# Patient Record
Sex: MALE
[De-identification: names, ages, dates, MRNs, and addresses within clinical notes are randomized per-mention and may not be internally consistent; named-entity substitution may affect disease eponyms.]

---

## 2021-10-14 ENCOUNTER — HOSPITAL ENCOUNTER (INPATIENT)
Dept: HOSPITAL 5 - ED | Age: 66
LOS: 2 days | Discharge: HOME | DRG: 291 | End: 2021-10-16
Attending: INTERNAL MEDICINE | Admitting: INTERNAL MEDICINE
Payer: MEDICARE

## 2021-10-14 DIAGNOSIS — I48.0: ICD-10-CM

## 2021-10-14 DIAGNOSIS — D62: ICD-10-CM

## 2021-10-14 DIAGNOSIS — J96.01: ICD-10-CM

## 2021-10-14 DIAGNOSIS — Z20.822: ICD-10-CM

## 2021-10-14 DIAGNOSIS — I13.0: Primary | ICD-10-CM

## 2021-10-14 DIAGNOSIS — E66.2: ICD-10-CM

## 2021-10-14 DIAGNOSIS — Z82.49: ICD-10-CM

## 2021-10-14 DIAGNOSIS — Z83.3: ICD-10-CM

## 2021-10-14 DIAGNOSIS — I50.33: ICD-10-CM

## 2021-10-14 DIAGNOSIS — N18.9: ICD-10-CM

## 2021-10-14 LAB
BUN SERPL-MCNC: 24 MG/DL (ref 9–20)
BUN/CREAT SERPL: 20 %
CALCIUM SERPL-MCNC: 8.7 MG/DL (ref 8.4–10.2)
HCT VFR BLD CALC: 23.8 % (ref 35.5–45.6)
HEMOLYSIS INDEX: 1
HGB BLD-MCNC: 7.5 GM/DL (ref 11.8–15.2)
MCHC RBC AUTO-ENTMCNC: 32 % (ref 32–34)
MCV RBC AUTO: 77 FL (ref 84–94)
PLATELET # BLD: 166 K/MM3 (ref 140–440)
RBC # BLD AUTO: 3.07 M/MM3 (ref 3.65–5.03)
T4 FREE SERPL-MCNC: 0.98 NG/DL (ref 0.76–1.46)

## 2021-10-14 PROCEDURE — 93005 ELECTROCARDIOGRAM TRACING: CPT

## 2021-10-14 PROCEDURE — 93306 TTE W/DOPPLER COMPLETE: CPT

## 2021-10-14 PROCEDURE — 86901 BLOOD TYPING SEROLOGIC RH(D): CPT

## 2021-10-14 PROCEDURE — 80162 ASSAY OF DIGOXIN TOTAL: CPT

## 2021-10-14 PROCEDURE — 80048 BASIC METABOLIC PNL TOTAL CA: CPT

## 2021-10-14 PROCEDURE — P9016 RBC LEUKOCYTES REDUCED: HCPCS

## 2021-10-14 PROCEDURE — 86850 RBC ANTIBODY SCREEN: CPT

## 2021-10-14 PROCEDURE — 84443 ASSAY THYROID STIM HORMONE: CPT

## 2021-10-14 PROCEDURE — 94760 N-INVAS EAR/PLS OXIMETRY 1: CPT

## 2021-10-14 PROCEDURE — 84484 ASSAY OF TROPONIN QUANT: CPT

## 2021-10-14 PROCEDURE — 85025 COMPLETE CBC W/AUTO DIFF WBC: CPT

## 2021-10-14 PROCEDURE — 86920 COMPATIBILITY TEST SPIN: CPT

## 2021-10-14 PROCEDURE — 36415 COLL VENOUS BLD VENIPUNCTURE: CPT

## 2021-10-14 PROCEDURE — 83735 ASSAY OF MAGNESIUM: CPT

## 2021-10-14 PROCEDURE — 86922 COMPATIBILITY TEST ANTIGLOB: CPT

## 2021-10-14 PROCEDURE — 84439 ASSAY OF FREE THYROXINE: CPT

## 2021-10-14 PROCEDURE — 86870 RBC ANTIBODY IDENTIFICATION: CPT

## 2021-10-14 PROCEDURE — 86900 BLOOD TYPING SEROLOGIC ABO: CPT

## 2021-10-14 PROCEDURE — G0378 HOSPITAL OBSERVATION PER HR: HCPCS

## 2021-10-14 PROCEDURE — 82962 GLUCOSE BLOOD TEST: CPT

## 2021-10-14 PROCEDURE — 83880 ASSAY OF NATRIURETIC PEPTIDE: CPT

## 2021-10-14 PROCEDURE — 85027 COMPLETE CBC AUTOMATED: CPT

## 2021-10-14 RX ADMIN — Medication SCH ML: at 23:01

## 2021-10-14 NOTE — HISTORY AND PHYSICAL REPORT
History of Present Illness


Chief complaint: 





I feel weak


History of present illness: 


64 YO Male with Obesity Hypoventilation Syndrome, DM, CHF, Anemia Chronic 

Disease presents to ED for evaluation.  Patient reports "I feel weak".  Patient 

states that he has experienced progressive and generalized weakness over the 

past 1 week with progressively worsening symptoms over the same timeframe.  

Patient knowledges decreased exercise tolerance, dyspnea on exertion, dyspnea at

rest, orthopnea, as well as paroxysmal nocturnal dyspnea.  EMS was notified and 

upon arrival the patient was found to be in distress and subsequently 

transported to [facility] for further care and evaluation of the aforementioned 

symptoms.  The patient was seen and evaluated in the emergency department.  All 

lab and imaging studies reviewed.  Patient found to have clinical symptoms 

consistent with diastolic CHF, as well as symptomatic anemia.  Patient placed in

observation status and admitted to telemetry.  Patient initiated on CHF 

protocol.  Patient transfused with packed red blood cells.  Patient denies 

fever, chills, chest pain, palpitation, productive cough, skin rash, recent ill 

contacts, known exposure to COVID-19, unilateral leg swelling, calf pain, 

individual/family history of DVT/PE/bleeding/blood clotting disorders.





Past History


Past Medical History: heart failure, hypertension, other (See HPI)


Past Surgical History: Other (Endoscopy)


Social history: , lives with family.  denies: smoking, alcohol abuse, 

prescription drug abuse


Family history: diabetes, hypertension





Medications and Allergies


                                    Allergies











Allergy/AdvReac Type Severity Reaction Status Date / Time


 


warfarin [From Coumadin] Allergy  Nausea Verified 10/14/21 13:21














Review of Systems


Constitutional: weakness





Exam





- Constitutional


Vitals: 


                                        











Temp Pulse Resp BP Pulse Ox


 


 98.2 F   84   15   140/53   100 


 


 10/14/21 13:16  10/14/21 14:45  10/14/21 14:45  10/14/21 14:45  10/14/21 14:45











General appearance: Present: mild distress





- EENT


Eyes: Present: PERRL


ENT: hearing intact, clear oral mucosa, other (Conjunctival pallor)





- Neck


Neck: Present: supple, normal ROM





- Respiratory


Respiratory effort: normal


Respiratory: bilateral: CTA





- Cardiovascular


Heart Sounds: Present: S1 & S2.  Absent: rub, click





- Extremities


Extremities: pulses symmetrical


Extremity abnormal: edema


Peripheral Pulses: within normal limits





- Abdominal


General gastrointestinal: Present: soft, non-tender, non-distended, normal bowel

sounds


Male genitourinary: Present: normal





- Integumentary


Integumentary: Present: clear, warm, dry





- Musculoskeletal


Musculoskeletal: generalized weakness





- Psychiatric


Psychiatric: appropriate mood/affect, intact judgment & insight





- Neurologic


Neurologic: CNII-XII intact, moves all extremities





HEART Score





- HEART Score


Troponin: 


                                        











Troponin T  < 0.010 ng/mL (0.00-0.029)   10/14/21  14:10    














Results





- Labs


CBC & Chem 7: 


                                 10/14/21 14:10





                                 10/14/21 14:10


Labs: 


                              Abnormal lab results











  10/14/21 10/14/21 Range/Units





  14:10 14:10 


 


RBC  3.07 L   (3.65-5.03)  M/mm3


 


Hgb  7.5 L   (11.8-15.2)  gm/dl


 


Hct  23.8 L   (35.5-45.6)  %


 


MCV  77 L   (84-94)  fl


 


MCH  24 L   (28-32)  pg


 


RDW  17.6 H   (13.2-15.2)  %


 


Chloride   107.9 H  ()  mmol/L


 


BUN   24 H  (9-20)  mg/dL


 


Glucose   112 H  ()  mg/dL














Assessment and Plan





- Patient Problems


(1) Congestive heart failure


Status: Acute   


Qualifiers: 


   Heart failure type: systolic   Heart failure chronicity: acute on chronic   

Qualified Code(s): I50.23 - Acute on chronic systolic (congestive) heart failure

  


Plan to address problem: 


Strict I's/O, monitor urine output every shift, daily weight, afterload reduc

tion, blood pressure control, supplemental oxygen, pulse oximetry, 

echocardiogram ordered and is pending at time of admission, thyroid panel, 

magnesium level, BNP








(2) Symptomatic anemia


Status: Acute   


Plan to address problem: 


Packed red blood cell transfusion, CBC, repeat CBC in a.m.








(3) Obesity hypoventilation syndrome


Status: Acute   


Plan to address problem: 


Balanced diet, increase physical activity discharge, outpatient pulmonary 

follow-up for sleep study








(4) DVT prophylaxis


Status: Acute   


Plan to address problem: 


SCD to bilateral lower extremities while in bed, patient is ambulatory

## 2021-10-14 NOTE — EMERGENCY DEPARTMENT REPORT
- General


Chief complaint: Recheck/Abnormal Lab/Rx


Stated complaint: ABNORMAL LABS


Time Seen by Provider: 10/14/21 13:30


Source: patient


Mode of arrival: Stretcher


Limitations: No Limitations





- History of Present Illness


Initial comments: 





Patient presents by ambulance secondary to generalized weakness and shortness of

breath.  He states that he is having ongoing generalized weakness and dyspnea.  

This is progressively worsened over the last week.  His wife decided to take him

to the hospital today to be seen because he was getting worse.  He was 

physically unable to walk out to the car due to weakness.  EMS was called.





Patient had outpatient labs drawn yesterday.  He was told today that his 

hemoglobin was 8 and that he needed to be seen.  He has a recent history of 

bleeding ulcers.  He was admitted twice at [place] for bleeding ulcers.  He 

required transfusion both times.


Severity scale (0 -10): 0





- Related Data


                                    Allergies











Allergy/AdvReac Type Severity Reaction Status Date / Time


 


warfarin [From Coumadin] Allergy  Nausea Verified 10/14/21 13:21














ED Review of Systems


ROS: 


Stated complaint: ABNORMAL LABS


Other details as noted in HPI





Comment: All other systems reviewed and negative


Constitutional: denies: fever


Eyes: denies: eye pain


ENT: denies: throat pain


Respiratory: denies: cough


Cardiovascular: denies: chest pain


Endocrine: denies: unexplained weight loss


Gastrointestinal: denies: abdominal pain


Genitourinary: denies: dysuria


Skin: denies: rash


Neurological: denies: headache


Hematological/Lymphatic: denies: easy bruising





ED Past Medical Hx





- Past Medical History


Previous Medical History?: Yes


Hx Congestive Heart Failure: Yes


Hx Diabetes: Yes





ED Physical Exam





- General


Limitations: No Limitations





ED Course


                                   Vital Signs











  10/14/21 10/14/21 10/14/21





  13:16 13:49 13:55


 


Temperature 98.2 F  


 


Pulse Rate 90 89 


 


Respiratory 16  





Rate   


 


Blood Pressure   


 


Blood Pressure 116/90  





[Left]   


 


O2 Sat by Pulse 99  97





Oximetry   














  10/14/21 10/14/21 10/14/21





  14:00 14:15 14:31


 


Temperature   


 


Pulse Rate 99 H 92 H 83


 


Respiratory 16 15 17





Rate   


 


Blood Pressure 131/56 131/56 140/53


 


Blood Pressure   





[Left]   


 


O2 Sat by Pulse 100 100 100





Oximetry   














  10/14/21





  14:45


 


Temperature 


 


Pulse Rate 84


 


Respiratory 15





Rate 


 


Blood Pressure 140/53


 


Blood Pressure 





[Left] 


 


O2 Sat by Pulse 100





Oximetry 














- Reevaluation(s)


Reevaluation #1: 





10/14/21 16:58


Labs have been noted. Case has been discussed with the patient and resource uti

lization. Will admit for transfusion.





ED Medical Decision Making





- Lab Data


Result diagrams: 


                                 10/14/21 14:10





                                 10/14/21 14:10





- Medical Decision Making





Patient presents with symptomatic anemia in the form of shortness of breath and 

generalized weakness. He has been transfused before. We will proceed with 

ongoing transfusion at this time. Patient does not have evidence of STEMI. He 

clinically does not have chest pain. I do not believe this represents NSTEMI. 

Whether or not he will benefit from a single unit is unknown. The degree of his 

congestive heart failure is also unknown. I would hate to create pulmonary edema

 by giving him packed red cells. He will be admitted for observation status.


Critical Care Time: No


Critical care attestation.: 


If time is entered above; I have spent that time in minutes in the direct care 

of this critically ill patient, excluding procedure time.








ED Disposition


Clinical Impression: 


 Symptomatic anemia





Disposition: 09 ADMITTED AS INPATIENT


Is pt being admited?: Yes


Condition: Stable

## 2021-10-15 LAB
BASOPHILS # (AUTO): 0.1 K/MM3 (ref 0–0.1)
BASOPHILS NFR BLD AUTO: 1.8 % (ref 0–1.8)
BUN SERPL-MCNC: 20 MG/DL (ref 9–20)
BUN/CREAT SERPL: 18 %
CALCIUM SERPL-MCNC: 8.6 MG/DL (ref 8.4–10.2)
EOSINOPHIL # BLD AUTO: 0.1 K/MM3 (ref 0–0.4)
EOSINOPHIL NFR BLD AUTO: 1.7 % (ref 0–4.3)
HCT VFR BLD CALC: 26.4 % (ref 35.5–45.6)
HEMOLYSIS INDEX: 4
HGB BLD-MCNC: 8.3 GM/DL (ref 11.8–15.2)
LYMPHOCYTES # BLD AUTO: 0.4 K/MM3 (ref 1.2–5.4)
LYMPHOCYTES NFR BLD AUTO: 7.2 % (ref 13.4–35)
MCHC RBC AUTO-ENTMCNC: 32 % (ref 32–34)
MCV RBC AUTO: 80 FL (ref 84–94)
MONOCYTES # (AUTO): 0.5 K/MM3 (ref 0–0.8)
MONOCYTES % (AUTO): 9.1 % (ref 0–7.3)
PLATELET # BLD: 153 K/MM3 (ref 140–440)
RBC # BLD AUTO: 3.32 M/MM3 (ref 3.65–5.03)

## 2021-10-15 PROCEDURE — 30233N1 TRANSFUSION OF NONAUTOLOGOUS RED BLOOD CELLS INTO PERIPHERAL VEIN, PERCUTANEOUS APPROACH: ICD-10-PCS | Performed by: INTERNAL MEDICINE

## 2021-10-15 RX ADMIN — METOPROLOL TARTRATE SCH: 100 TABLET, FILM COATED ORAL at 22:04

## 2021-10-15 RX ADMIN — Medication SCH ML: at 21:55

## 2021-10-15 RX ADMIN — Medication SCH ML: at 22:03

## 2021-10-15 RX ADMIN — METOPROLOL TARTRATE SCH MG: 100 TABLET, FILM COATED ORAL at 17:36

## 2021-10-15 RX ADMIN — ONDANSETRON PRN MG: 2 INJECTION INTRAMUSCULAR; INTRAVENOUS at 04:15

## 2021-10-15 RX ADMIN — ONDANSETRON PRN MG: 2 INJECTION INTRAMUSCULAR; INTRAVENOUS at 21:53

## 2021-10-15 NOTE — CONSULTATION
History of Present Illness


Consult date: 10/15/21


Requesting physician: SELMA MCCLENDON


Consult reason: congestive heart failure


History of present illness: 





Patient is a 65-year-old male with a past medical history A. fib, of chronic 

anemia, CKD HFpEF, GI bleeds, diabetes presented to the ED with a complaint of 

feeling weak x1. Patient reports that over the last week he has progressively 

felt weaker he notices decreased exercise tolerance dyspnea at exertion dyspnea 

at rest orthopnea and paroxysmal nocturnal dyspnea. Patient had labs drawn 

recently and received call from primary care clinic yesterday telling him that 

hemoglobin was low and they needed to go to the ED. Of note at the time of 

interview patient recently had blood transfusions patient reports that after 

receiving blood he feels much better. Patient is previously known to our pract

ice. He follows with follows with Dr. Dneis Brown of [place]. Cardiology is 

consulted for heart failure








Past History


Past Medical History: heart failure, hypertension, other (See HPI)


Past Surgical History: Other (Endoscopy)


Social history: , lives with family.  denies: smoking, alcohol abuse, 

prescription drug abuse


Family history: diabetes, hypertension





Medications and Allergies


                                    Allergies











Allergy/AdvReac Type Severity Reaction Status Date / Time


 


warfarin [From Coumadin] Allergy  Nausea Verified 10/14/21 13:21











                                Home Medications











 Medication  Instructions  Recorded  Confirmed  Last Taken  Type


 


ALPRAZolam 0.25 mg PO PRN 10/14/21 10/14/21 Unknown History


 


Metoprolol 100 mg PO DAILY 10/14/21 10/14/21 Unknown History


 


NovoLIN 70/30 25 units SC DAILY 10/14/21 10/14/21 10/14/21 08:00 History


 


Sertraline 50 mg PO DAILY 10/14/21 10/14/21 Unknown History


 


Xarelto 20 mg PO DAILY 10/14/21 10/14/21 Unknown History


 


dilTIAZem 180 mg PO BID 10/14/21 10/14/21 Unknown History


 


metFORMIN 1,000 mg PO BID 10/14/21 10/14/21 Unknown History











Active Meds: 


Active Medications





Acetaminophen (Acetaminophen 325 Mg Tab)  650 mg PO Q4H PRN


   PRN Reason: Pain MILD(1-3)/Fever >100.5/HA


   Last Admin: 10/15/21 03:58 Dose:  650 mg


   Documented by: 


Albuterol (Albuterol 2.5 Mg/3 Ml Nebu)  2.5 mg IH Q4HRT PRN


   PRN Reason: Shortness Of Breath


Bumetanide (Bumetanide 1 Mg Tab)  2 mg PO DAILY [facility]


Digoxin (Digoxin 0.125 Mg Tab)  0.125 mg PO DAILY@1700 [facility]


Diltiazem HCl (Diltiazem 60 Mg Tab)  180 mg PO BID [facility]


Hydromorphone HCl (Hydromorphone 1 Mg/1 Ml Inj)  0.5 mg IV Q23H PRN


   PRN Reason: Pain , Severe (7-10)


Sodium Chloride (Nacl 0.9% 500 Ml)  500 mls @ 0 mls/hr IV ONCE ANN


Metoprolol Tartrate (Metoprolol Tartrate 100 Mg Tab)  100 mg PO BID [facility]


Ondansetron HCl (Ondansetron 4 Mg/2 Ml Inj)  4 mg IV Q8H PRN


   PRN Reason: Nausea And Vomiting


   Last Admin: 10/15/21 04:15 Dose:  4 mg


   Documented by: 


Oxycodone/Acetaminophen (Oxycodone /Acetaminophen 5-325mg Tab)  1 tab PO Q16H 

PRN


   PRN Reason: Pain, Moderate (4-6)


Sodium Chloride (Sodium Chloride 0.9% 10 Ml Flush Syringe)  10 ml IV BID ANN


Sodium Chloride (Sodium Chloride 0.9% 10 Ml Flush Syringe)  10 ml IV PRN PRN


   PRN Reason: LINE FLUSH











Review of Systems


Constitutional: no weight loss, no weight gain, no fever


Cardiovascular: orthopnea, shortness of breath, dyspnea on exertion, paroxysmal 

nocturnal dyspnea, no chest pain


Respiratory: shortness of breath, dyspnea on exertion, no cough, no cough with 

sputum, no excessive sputum


Gastrointestinal: no abdominal pain, no nausea, no vomiting


Musculoskeletal: no neck pain, no shooting arm pain, no arm numbness/tingling


Integumentary: no rash, no pruritis, no redness


Neurological: no head injury, no transient paralysis, no paralysis


Psychiatric: no anxiety, no memory loss


Endocrine: no cold intolerance, no heat intolerance





Physical Examination


                                   Vital Signs











Temp Pulse Resp BP Pulse Ox


 


 98.2 F   90   16   116/90   99 


 


 10/14/21 13:16  10/14/21 13:16  10/14/21 13:16  10/14/21 13:16  10/14/21 13:16











General appearance: no acute distress


HEENT: Positive: PERRL


Cardiac: Positive: irregularly irregular


Lungs: Positive: Normal Breath Sounds


Neuro: Positive: Grossly Intact


Abdomen: Positive: Soft, Active Bowel Sounds


Skin: Negative: Rash, Suspicious Lesions, Ulceration


Extremities: Present: upper extr. pulses, lower extr. pulses, edema





Results





                                 10/15/21 11:48





                                 10/15/21 07:21


                                       CBC











  10/15/21 Range/Units





  11:48 


 


WBC  5.6  (4.5-11.0)  K/mm3


 


RBC  3.32 L  (3.65-5.03)  M/mm3


 


Hgb  8.3 L  (11.8-15.2)  gm/dl


 


Hct  26.4 L  (35.5-45.6)  %


 


Plt Count  153  (140-440)  K/mm3


 


Lymph # (Auto)  0.4 L  (1.2-5.4)  K/mm3


 


Mono # (Auto)  0.5  (0.0-0.8)  K/mm3


 


Eos # (Auto)  0.1  (0.0-0.4)  K/mm3


 


Baso # (Auto)  0.1  (0.0-0.1)  K/mm3








                          Comprehensive Metabolic Panel











  10/15/21 Range/Units





  07:21 


 


Sodium  143  (137-145)  mmol/L


 


Potassium  4.7  (3.6-5.0)  mmol/L


 


Chloride  105.9  ()  mmol/L


 


Carbon Dioxide  26  (22-30)  mmol/L


 


BUN  20  (9-20)  mg/dL


 


Creatinine  1.1  (0.8-1.3)  mg/dL


 


Glucose  107 H  ()  mg/dL


 


Calcium  8.6  (8.4-10.2)  mg/dL














- Imaging and Cardiology


Echo: report reviewed


EKG: report reviewed





EKG interpretations





- Telemetry


EKG Rhythm: Atrial Fibrillation





- EKG


Supraventricular dysrhythmia: atrial fibrillation





Assessment and Plan





EKG shows A. fib rate controlled no acute ischemic changes


Outpatient regimen Xarelto, Bumex 2 mg p.o. twice daily diltiazem 180 mg twice 

daily, metoprolol 100 mg twice daily digoxin 250 mcg


Echo 10/14/2021-EF 50 to 55%, right ventricle systolic function is normal, right

ventricle mildly dilated, left atrium mildly dilated, and no aortic 

regurgitation, mild tricuspid regurgitation, mild mitral regurgitation


We will hold anticoagulation due to patient's anemia and history of GI bleeds


We will resume patient's outpatient medication regimen. Bumex 2mg p.o. daily


Recommend patient follows up with his primary cardiologist and consider a 

watchman device due to his chronic anemia and history of GI bleeds








Patient seen in conjunction with Dr. Newby who agrees with plan of care. Will 

continue to follow








- Patient Problems


(1) CKD (chronic kidney disease)


Current Visit: Yes   Status: Acute   





(2) Diabetes


Current Visit: Yes   Status: Acute   





(3) Afib


Current Visit: Yes   Status: Acute   





(4) Congestive heart failure


Current Visit: No   Status: Acute   


Qualifiers: 


   Heart failure type: systolic   Heart failure chronicity: acute on chronic   

Qualified Code(s): I50.23 - Acute on chronic systolic (congestive) heart failure

  





(5) Obesity hypoventilation syndrome


Current Visit: No   Status: Acute   





(6) Anemia


Current Visit: Yes   Status: Acute

## 2021-10-15 NOTE — PROGRESS NOTE
Assessment and Plan


Assessment and plan: 





Acute on chronic diastolic heart failure.





Acute blood loss anemia





Symptomatic anemia





Obesity hypoventilation syndrome





Acute hypoxic respiratory failure.  Etiology secondary to above





Paroxysmal atrial fibrillation.  Rate controlled





10/15/2021.  I discussed the case with cardiology who reports patient had recent

echocardiogram at [facility] with normal LV function.  Patient 

was on digoxin at home for rate control and Xarelto for anticoagulation.  We 

will continue to hold Xarelto until further evaluation by GI.  Patient may need 

pill endoscopy.  Patient reportedly has had history of symptomatic anemia x2 

requiring PRBCs on 2 separate hospitalizations in the recent past.  Patient with

recent upper endoscopy July 30, 2021 at [facility].  Consult GI for 

further evaluation.  Patient is s/p 2 units PRBCs on admission.





History


Interval history: 





No new issues overnight.  No active bleeding, hematochezia or melena.





Hospitalist Physical





- Constitutional


Vitals: 


                                        











Temp Pulse Resp BP Pulse Ox


 


 98.4 F   96 H  18   128/64   98 


 


 10/15/21 06:05  10/15/21 06:05  10/15/21 06:05  10/15/21 06:05  10/15/21 06:05











General appearance: Present: mild distress





- EENT


Eyes: Present: PERRL, EOM intact


ENT: hearing intact, clear oral mucosa, dentition normal





- Neck


Neck: Present: supple, normal ROM





- Respiratory


Respiratory effort: normal


Respiratory: bilateral: CTA





- Cardiovascular


Rhythm: regular


Heart Sounds: Present: S1 & S2.  Absent: gallop, rub





- Extremities


Extremities: no ischemia, No edema, Full ROM





- Abdominal


General gastrointestinal: soft, non-tender, non-distended, normal bowel sounds





- Integumentary


Integumentary: Present: clear, warm, dry





- Neurologic


Neurologic: CNII-XII intact, moves all extremities





HEART Score





- HEART Score


Troponin: 


                                        











Troponin T  < 0.010 ng/mL (0.00-0.029)   10/14/21  14:10    














Results





- Labs


CBC & Chem 7: 


                                 10/14/21 14:10





                                 10/15/21 07:21


Labs: 


                             Laboratory Last Values











WBC  5.0 K/mm3 (4.5-11.0)   10/14/21  14:10    


 


RBC  3.07 M/mm3 (3.65-5.03)  L  10/14/21  14:10    


 


Hgb  7.5 gm/dl (11.8-15.2)  L  10/14/21  14:10    


 


Hct  23.8 % (35.5-45.6)  L  10/14/21  14:10    


 


MCV  77 fl (84-94)  L  10/14/21  14:10    


 


MCH  24 pg (28-32)  L  10/14/21  14:10    


 


MCHC  32 % (32-34)   10/14/21  14:10    


 


RDW  17.6 % (13.2-15.2)  H  10/14/21  14:10    


 


Plt Count  166 K/mm3 (140-440)   10/14/21  14:10    


 


Sodium  143 mmol/L (137-145)   10/15/21  07:21    


 


Potassium  4.7 mmol/L (3.6-5.0)   10/15/21  07:21    


 


Chloride  105.9 mmol/L ()   10/15/21  07:21    


 


Carbon Dioxide  26 mmol/L (22-30)   10/15/21  07:21    


 


Anion Gap  16 mmol/L  10/15/21  07:21    


 


BUN  20 mg/dL (9-20)   10/15/21  07:21    


 


Creatinine  1.1 mg/dL (0.8-1.3)   10/15/21  07:21    


 


Estimated GFR  > 60 ml/min  10/15/21  07:21    


 


BUN/Creatinine Ratio  18 %  10/15/21  07:21    


 


Glucose  107 mg/dL ()  H  10/15/21  07:21    


 


POC Glucose  108 mg/dL ()  H  10/14/21  23:55    


 


Calcium  8.6 mg/dL (8.4-10.2)   10/15/21  07:21    


 


Magnesium  2.00 mg/dL (1.7-2.3)   10/14/21  17:56    


 


Troponin T  < 0.010 ng/mL (0.00-0.029)   10/14/21  14:10    


 


NT-Pro-B Natriuret Pep  1286 pg/mL (0-900)  H  10/14/21  17:56    


 


TSH  3.870 mlU/mL (0.270-4.200)   10/14/21  17:56    


 


Free T4  0.98 ng/dL (0.76-1.46)   10/14/21  17:56    


 


Blood Type  B POSITIVE   10/14/21  14:15    


 


Antibody Screen  Positive   10/14/21  14:15    


 


Antibody Identification  Anti-E   10/14/21  14:15    


 


Crossmatch  See Detail   10/14/21  14:15    














Active Medications





- Current Medications


Current Medications: 














Generic Name Dose Route Start Last Admin





  Trade Name Freq  PRN Reason Stop Dose Admin


 


Acetaminophen  650 mg  10/14/21 17:42  10/15/21 03:58





  Acetaminophen 325 Mg Tab  PO   650 mg





  Q4H PRN   Administration





  Pain MILD(1-3)/Fever >100.5/HA  


 


Albuterol  2.5 mg  10/14/21 17:42 





  Albuterol 2.5 Mg/3 Ml Nebu  IH  





  Q4HRT PRN  





  Shortness Of Breath  


 


Hydromorphone HCl  0.5 mg  10/14/21 17:42 





  Hydromorphone 1 Mg/1 Ml Inj  IV  





  Q23H PRN  





  Pain , Severe (7-10)  


 


Sodium Chloride  500 mls @ 0 mls/hr  10/14/21 17:44 





  Nacl 0.9% 500 Ml  IV  





  ONCE ANN  





  As Directed  


 


Ondansetron HCl  4 mg  10/14/21 17:42  10/15/21 04:15





  Ondansetron 4 Mg/2 Ml Inj  IV   4 mg





  Q8H PRN   Administration





  Nausea And Vomiting  


 


Oxycodone/Acetaminophen  1 tab  10/14/21 17:42 





  Oxycodone /Acetaminophen 5-325mg Tab  PO  





  Q16H PRN  





  Pain, Moderate (4-6)  


 


Sodium Chloride  10 ml  10/14/21 22:00 





  Sodium Chloride 0.9% 10 Ml Flush Syringe  IV  





  BID ANN  


 


Sodium Chloride  10 ml  10/14/21 17:42 





  Sodium Chloride 0.9% 10 Ml Flush Syringe  IV  





  PRN PRN  





  LINE FLUSH

## 2021-10-15 NOTE — EVENT NOTE
Date: 10/15/21


Full GI consult dictated


- pt previous pud at [facility] now dark stools 1 1/2 wks ago with general weakness


- Hgb on admission 7.5 (pt reports approx same at [facility])


- denies other GI complaints


- pt feels better after transfusion and would like to go


- no plans to scope at this time


- ok to dc even today from GI standpoint on PPI qd and f/u outpt


- will follow

## 2021-10-16 VITALS — DIASTOLIC BLOOD PRESSURE: 69 MMHG | SYSTOLIC BLOOD PRESSURE: 124 MMHG

## 2021-10-16 RX ADMIN — Medication SCH: at 10:04

## 2021-10-16 RX ADMIN — METOPROLOL TARTRATE SCH MG: 100 TABLET, FILM COATED ORAL at 09:57

## 2021-10-16 RX ADMIN — Medication SCH ML: at 10:05

## 2021-10-16 NOTE — DISCHARGE SUMMARY
Providers





- Providers


Date of Admission: 


10/15/21 11:00





Date of discharge: 10/16/21


Attending physician: 


SELMA MCCLENDON





                                        





10/15/21 07:56


Consult to Cardiology [CONS] Routine 


   Consulting Provider: ENMANUEL MUNGUIA


   Reason For Exam: acute HF





10/15/21 10:54


Consult to Physician [CONS] Routine 


   Comment: 


   Consulting Provider: MYCHAL KWON


   Physician Instructions: 


   Reason For Exam: ABLA, symptomatic anemia











Primary care physician: 


PRIMARY CARE MD








Hospitalization


Reason for admission: Symptomatic anemia


Condition: Stable


Hospital course: 





Patient is a 65-year-old male with a past medical history A. fib, of chronic 

anemia, CKD HFpEF, GI bleeds, diabetes presented to the ED with a complaint of 

feeling weak x1.  Patient reports that over the last week he has progressively 

felt weaker he notices decreased exercise tolerance dyspnea at exertion dyspnea 

at rest orthopnea and paroxysmal nocturnal dyspnea. Patient had labs drawn 

recently and received call from primary care clinic the day prior to admission 

telling him that hemoglobin was low and they needed to go to the ED. Of note, at

 the time of interview, patient recently had blood transfusion and reported that

 after receiving blood he felt much better.He follows with follows with Dr. Denis Brown of [facility].  The patient was admitted with diagnosis of acute on 

chronic diastolic heart failure, acute blood loss anemia, symptomatic anemia, 

obesity hypoventilation syndrome, paroxysmal atrial fibrillation and acute 

hypoxic respiratory failure cardiology was consulted for heart failure and A. 

fib.  The patient had echocardiogram that revealed EF 50 to 55%, right ventricle

 systolic function is normal, right ventricle mildly dilated, left atrium mildly

 dilated, and no aortic regurgitation, mild tricuspid regurgitation, mild mitral

 regurgitation.  Cardiology recommended holding anticoagulation due to patient's

 anemia history of GI bleeds.  Cardiology also recommended continuing Bumex 2 mg

 p.o. daily, diltiazem 180 mg twice daily and metoprolol 100 mg twice daily and 

digoxin daily.  GI saw the patient in consultation and had no plans to perform 

scope at this time.  GI reported patient could discharge home with PPI daily and

 follow-up as an outpatient.  Therefore, patient will be discharged home and is 

to follow-up with PCP, primary cardiologist and GI with Dr. Kwon.  Dedicated 

discharge time 35 minutes





Disposition: 01 HOME / SELF CARE / HOMELESS


Final Discharge Diagnosis (Prints w/discharge instructions): Acute blood loss 

anemia, symptomatic anemia, acute on chronic diastolic heart failure, obesity 

hypoventilation syndrome, acute hypoxic respiratory failure, paroxysmal atrial 

fibrillation





Core Measure Documentation





- Palliative Care


Palliative Care/ Comfort Measures: Not Applicable





- Core Measures


Any of the following diagnoses?: heart failure





- Heart Failure Discharge Requirements


ACE/ARB for LVSD if EF <40%: Not Applicable


Beta blocker at discharge: Yes





Exam





- Constitutional


Vitals: 


                                        











Temp Pulse Resp BP Pulse Ox


 


 97.8 F   65   16   138/77   97 


 


 10/16/21 03:33  10/16/21 04:00  10/16/21 03:33  10/16/21 03:33  10/16/21 03:33











General appearance: Present: no acute distress, well-nourished





- EENT


Eyes: Present: PERRL


ENT: hearing intact, clear oral mucosa





- Neck


Neck: Present: supple, normal ROM





- Respiratory


Respiratory effort: normal


Respiratory: bilateral: CTA





- Cardiovascular


Heart Sounds: Present: S1 & S2.  Absent: rub, click





- Extremities


Extremities: pulses symmetrical, No edema


Peripheral Pulses: within normal limits





- Abdominal


General gastrointestinal: Present: soft, non-tender, non-distended, normal bowel

 sounds


Male genitourinary: Present: normal





- Integumentary


Integumentary: Present: clear, warm, dry





- Musculoskeletal


Musculoskeletal: gait normal, strength equal bilaterally





- Psychiatric


Psychiatric: appropriate mood/affect, intact judgment & insight





- Neurologic


Neurologic: CNII-XII intact, moves all extremities





Plan


Activity: advance as tolerated


Weight Bearing Status: Weight Bear as Tolerated


Diet: regular


Follow up with: 


PRIMARY CARE,MD [Primary Care Provider] - 7 Days


MYCHAL KWON MD [Staff Physician] - 7 Days


Prescriptions: 


Bumetanide [Bumex 1 mg tab] 2 mg PO DAILY #30 tablet


dilTIAZem [Cardizem] 180 mg PO BID #60 tablet


Digoxin [Lanoxin] 0.125 mg PO DAILY@1700 #30 tablet


Pantoprazole [Protonix] 40 mg PO BID #60 tablet

## 2021-10-16 NOTE — CONSULTATION
DATE OF CONSULTATION: 10/15/2021



REFERRING PHYSICIAN:  Dontrell Rust M.D.



INDICATION:  Anemia.



HISTORY OF PRESENT ILLNESS:  The patient is a 65-year-old obese white male with 

history of diabetes, CHF, anemia and chronic onset of obesity hypoventilation 

syndrome.  The patient has been seen by GI for anemia.  The patient reports back

in June of this year, he presented to [facility] with general 

weakness and subsequently was diagnosed with significant anemia requiring 

transfusion and EGD done a few weeks later showed an ulcer.  The patient reports

he was supposed to have a repeat EGD in November of this year.  The patient 

reports about a week and a half ago, he noted a couple days of some dark stools 

and since that time, has been generally weak.  He reports when he left [facility], he was at a hemoglobin of 7.  The patient subsequently denies any 

further dark stools over the last week and subsequently came to the Emergency 

Room here where he was noted to be anemic, admitted and GI consulted.  The 

patient denies any hematemesis.  He denies any bright red or dark stools over 

the last week.  No other specific complaints.



PAST MEDICAL HISTORY:

1.  Hypertension.

2.  Heart failure.



MEDICATIONS:  Reviewed and updated in chart.



ALLERGIES:  Coumadin.



SOCIAL HISTORY:  Denies alcohol, tobacco or drug abuse.



FAMILY HISTORY:  Negative for colon cancer, IBD or liver disease.



REVIEW OF SYSTEMS:

GENERAL:  Reports some weakness.

HEENT:  No visual complaints or tinnitus.

PULMONARY:  No shortness of breath or chest pain.

GASTROINTESTINAL:  Reports dark stools a week and a half ago.

All points of 13-point review of system otherwise negative.



PHYSICAL EXAMINATION:

VITAL SIGNS:  Temperature 98.4, pulse 96, respiration 18, blood pressure 128/60.

GENERAL:  Fairly nourished, obese white male, in no acute distress.

HEENT:  Pupils round and reactive.  Pulmonary rhonchi.

CARDIOVASCULAR:  Regular rate and rhythm.  Normal S1, S2.

ABDOMEN:  Positive bowel sound, soft.

SKIN:  No obvious rashes.



LABORATORY DATA:  White count of 5.6, hemoglobin and hematocrit of 7.5 and 23.8,

platelet count of 166.  Chem-7 within normal limits.  Coags within normal 

limits.



ASSESSMENT:  A 65-year-old male who reports that in June of this year.  He was 

weak and dizzy and anemic and subsequently required blood transfusions at 

[facility] with the EGD done a few weeks later showing peptic ulcer and 

was told to have a repeat EGD this upcoming November, now presented with noted 

dark stools about a week and a half ago, which returned to normal and general 

weakness.  The patient presented with a hemoglobin of 7.5, which he reports that

when he left [facility], he was running in the 7s.  He denies any further 

signs of bleeding over the last week.  The patient himself is anxious to go 

home.  Management as noted below.



PLAN:

1.  Follow hematocrit and transfuse as needed.

2.  PPI IV b.i.d.

3.  We will attempt to get records from [facility].

4.  Cardiology evaluation ongoing and we will follow.

5.  From a GI standpoint, the patient is stable after transfusion.  He can go 

with plans to repeat EGD with Dr. Leal, his previous gastroenterologist as an

outpatient.

6.  If patient stays, we will follow.







DD: 10/15/2021 03:24 PM

DT: 10/16/2021 04:07 AM

TID: [id number] RECEIPT: [id number]

CAB/STD

## 2021-10-21 NOTE — ELECTROCARDIOGRAPH REPORT
[facility]

                                       

Test Date:    2021-10-15               Test Time:    10:52:07

Pat Name:     LEON CEJA                Department:   

Patient ID:   [id number]          Room:         A482 1

Gender:       M                        Technician:   SC

:          [id number]               Requested By: MYCHAL KEITA

Order Number: [id number]              Reading MD:   Denisse Loja

                                 Measurements

Intervals                              Axis          

Rate:         94                       P:            

TN:                                    QRS:          -16

QRSD:         112                      T:            41

QT:           382                                    

QTc:          479                                    

                           Interpretive Statements

Atrial fibrillation

Low voltage, extremity leads

Consider old anterior infarct

No previous ECG available for comparison

Electronically Signed On 10- 9:46:49 EDT by Denisse Loja

## 2021-11-01 ENCOUNTER — DASHBOARD ENCOUNTERS (OUTPATIENT)
Age: 66
End: 2021-11-01

## 2021-11-04 ENCOUNTER — OFFICE VISIT (OUTPATIENT)
Dept: URBAN - METROPOLITAN AREA CLINIC 52 | Facility: CLINIC | Age: 66
End: 2021-11-04

## 2021-11-04 RX ORDER — GABAPENTIN 600 MG/1
1 TABLET TABLET, FILM COATED ORAL
Status: ACTIVE | COMMUNITY

## 2021-11-04 RX ORDER — SERTRALINE 25 MG/1
1 TABLET TABLET, FILM COATED ORAL ONCE A DAY
Status: ACTIVE | COMMUNITY

## 2021-11-04 RX ORDER — RIVAROXABAN 20 MG/1
1 TABLET TABLET, FILM COATED ORAL ONCE A DAY
Refills: 0 | Status: ACTIVE | COMMUNITY
Start: 1900-01-01

## 2021-11-04 RX ORDER — METOPROLOL TARTRATE 100 MG/1
1 TABLET WITH FOOD TABLET, FILM COATED ORAL TWICE A DAY
Refills: 0 | Status: ACTIVE | COMMUNITY
Start: 1900-01-01

## 2021-11-04 RX ORDER — BUMETANIDE 2 MG/1
1 TABLET TABLET ORAL BID
Status: ACTIVE | COMMUNITY

## 2021-11-04 RX ORDER — VITAMIN A 2400 MCG
1 TABLET CAPSULE ORAL ONCE A DAY
Status: ACTIVE | COMMUNITY

## 2021-11-04 RX ORDER — SIMVASTATIN 10 MG/1
1 TABLET IN THE EVENING TABLET, FILM COATED ORAL ONCE A DAY
Refills: 0 | Status: ACTIVE | COMMUNITY
Start: 1900-01-01

## 2021-11-04 RX ORDER — INSULIN HUMAN 100 [IU]/ML
50 UNITS INJECTION, SUSPENSION SUBCUTANEOUS DAILY
Status: ACTIVE | COMMUNITY

## 2021-11-04 RX ORDER — ALPRAZOLAM 0.25 MG/1
1 TABLET TABLET ORAL DAILY
Status: ACTIVE | COMMUNITY

## 2021-11-04 RX ORDER — METFORMIN HYDROCHLORIDE 1000 MG/1
1 TABLET WITH A MEAL TABLET, FILM COATED ORAL BID
Status: ACTIVE | COMMUNITY

## 2024-10-15 ENCOUNTER — OFFICE VISIT (OUTPATIENT)
Dept: URBAN - METROPOLITAN AREA CLINIC 109 | Facility: CLINIC | Age: 69
End: 2024-10-15
Payer: MEDICARE

## 2024-10-15 VITALS
WEIGHT: 315 LBS | SYSTOLIC BLOOD PRESSURE: 125 MMHG | HEART RATE: 82 BPM | TEMPERATURE: 97.5 F | DIASTOLIC BLOOD PRESSURE: 72 MMHG | BODY MASS INDEX: 42.66 KG/M2 | HEIGHT: 72 IN

## 2024-10-15 DIAGNOSIS — K59.1 FUNCTIONAL DIARRHEA: ICD-10-CM

## 2024-10-15 PROBLEM — 47812002: Status: ACTIVE | Noted: 2024-10-15

## 2024-10-15 PROCEDURE — 99203 OFFICE O/P NEW LOW 30 MIN: CPT | Performed by: INTERNAL MEDICINE

## 2024-10-15 RX ORDER — SIMVASTATIN 10 MG/1
1 TABLET IN THE EVENING TABLET, FILM COATED ORAL ONCE A DAY
Refills: 0 | Status: ACTIVE | COMMUNITY
Start: 1900-01-01

## 2024-10-15 RX ORDER — INSULIN HUMAN 100 [IU]/ML
50 UNITS INJECTION, SUSPENSION SUBCUTANEOUS DAILY
Status: ON HOLD | COMMUNITY

## 2024-10-15 RX ORDER — NORTRIPTYLINE HYDROCHLORIDE 10 MG/1
CAPSULE ORAL
Qty: 90 CAPSULE | Status: ACTIVE | COMMUNITY

## 2024-10-15 RX ORDER — RIVAROXABAN 20 MG/1
1 TABLET TABLET, FILM COATED ORAL ONCE A DAY
Refills: 0 | Status: ACTIVE | COMMUNITY
Start: 1900-01-01

## 2024-10-15 RX ORDER — LISINOPRIL 2.5 MG/1
TABLET ORAL
Qty: 30 TABLET | Status: ACTIVE | COMMUNITY

## 2024-10-15 RX ORDER — EMPAGLIFLOZIN 10 MG/1
1 TABLET TABLET, FILM COATED ORAL ONCE A DAY
Status: ACTIVE | COMMUNITY

## 2024-10-15 RX ORDER — DIGOXIN 0.12 MG/1
TABLET ORAL
Qty: 90 TABLET | Status: ACTIVE | COMMUNITY

## 2024-10-15 RX ORDER — SERTRALINE HYDROCHLORIDE 50 MG/1
TABLET, FILM COATED ORAL
Qty: 135 TABLET | Status: ACTIVE | COMMUNITY

## 2024-10-15 RX ORDER — METFORMIN HYDROCHLORIDE 1000 MG/1
1 TABLET WITH A MEAL TABLET, FILM COATED ORAL BID
Status: ON HOLD | COMMUNITY

## 2024-10-15 RX ORDER — GABAPENTIN 600 MG/1
1 TABLET TABLET, FILM COATED ORAL
Status: ACTIVE | COMMUNITY

## 2024-10-15 RX ORDER — BUMETANIDE 2 MG/1
1 TABLET TABLET ORAL BID
Status: ACTIVE | COMMUNITY

## 2024-10-15 RX ORDER — METOPROLOL TARTRATE 100 MG/1
1 TABLET WITH FOOD TABLET, FILM COATED ORAL TWICE A DAY
Refills: 0 | Status: ON HOLD | COMMUNITY
Start: 1900-01-01

## 2024-10-15 RX ORDER — METOPROLOL SUCCINATE 25 MG/1
TABLET, EXTENDED RELEASE ORAL
Qty: 90 TABLET | Status: ACTIVE | COMMUNITY

## 2024-10-15 RX ORDER — ALPRAZOLAM 0.25 MG/1
1 TABLET TABLET ORAL DAILY
Status: ACTIVE | COMMUNITY

## 2024-10-15 RX ORDER — PRIMIDONE 250 MG/1
1 TABLET TABLET ORAL TWICE A DAY
Status: ACTIVE | COMMUNITY

## 2024-10-15 RX ORDER — VITAMIN A 2400 MCG
1 TABLET CAPSULE ORAL ONCE A DAY
Status: ON HOLD | COMMUNITY

## 2024-10-15 RX ORDER — SERTRALINE 25 MG/1
1 TABLET TABLET, FILM COATED ORAL ONCE A DAY
Status: ON HOLD | COMMUNITY

## 2024-10-15 NOTE — HPI-TODAY'S VISIT:
67 yo WM here for evaluation of diarrhea x 1 year.  BMs usu qd, but has problems with fecal urgency and diarrhea with incontinence and messing clothes.  Imodium will frequently work.  Pt has this problem ~ 3 days/week.  Pt has seen Dr. Irvin for this, has undergone EGD/Colon 5/20/24.  No GI bleed.  No abd pain, N/V.

## 2024-10-15 NOTE — PHYSICAL EXAM CONSTITUTIONAL:
well developed, well nourished, in no acute distress , ambulating with cane, normal communication ability